# Patient Record
Sex: FEMALE | Race: WHITE | NOT HISPANIC OR LATINO | Employment: OTHER | URBAN - METROPOLITAN AREA
[De-identification: names, ages, dates, MRNs, and addresses within clinical notes are randomized per-mention and may not be internally consistent; named-entity substitution may affect disease eponyms.]

---

## 2017-10-10 ENCOUNTER — CATARACT CONSULT (OUTPATIENT)
Dept: URBAN - METROPOLITAN AREA CLINIC 43 | Facility: CLINIC | Age: 52
End: 2017-10-10

## 2017-10-10 VITALS — HEIGHT: 55 IN | SYSTOLIC BLOOD PRESSURE: 112 MMHG | DIASTOLIC BLOOD PRESSURE: 75 MMHG | HEART RATE: 73 BPM

## 2017-10-10 DIAGNOSIS — Z79.899: ICD-10-CM

## 2017-10-10 DIAGNOSIS — H25.812: ICD-10-CM

## 2017-10-10 DIAGNOSIS — H25.811: ICD-10-CM

## 2017-10-10 DIAGNOSIS — H04.123: ICD-10-CM

## 2017-10-10 PROCEDURE — G8783 BP SCRN PERF REC INTERVAL: HCPCS

## 2017-10-10 PROCEDURE — 4040F PNEUMOC VAC/ADMIN/RCVD: CPT

## 2017-10-10 PROCEDURE — 92025-3 CORNEAL TOPO, REFUSED

## 2017-10-10 PROCEDURE — 92136TC INTERFEROMETRY - TECHNICAL COMPONENT

## 2017-10-10 PROCEDURE — 92134 CPTRZ OPH DX IMG PST SGM RTA: CPT

## 2017-10-10 PROCEDURE — G8427 DOCREV CUR MEDS BY ELIG CLIN: HCPCS

## 2017-10-10 PROCEDURE — 99214 OFFICE O/P EST MOD 30 MIN: CPT

## 2017-10-10 RX ORDER — MOXIFLOXACIN HYDROCHLORIDE 5 MG/ML: 1 SOLUTION/ DROPS OPHTHALMIC

## 2017-10-10 RX ORDER — DUREZOL 0.5 MG/ML: 1 EMULSION OPHTHALMIC TWICE A DAY

## 2017-10-10 RX ORDER — NEPAFENAC 3 MG/ML: 1 SUSPENSION/ DROPS OPHTHALMIC ONCE A DAY

## 2017-10-10 ASSESSMENT — VISUAL ACUITY
OS_CC: J10
OS_SC: 20/200
OS_CC: 20/50
OS_SC: J12
OD_SC: J12
OD_CC: 20/30
OD_SC: 20/200
OS_AM: 20/30
OD_BAT: 20/80 MED
OD_CC: J12
OD_PAM: 20/30-2
OS_BAT: 20/70 MED

## 2017-10-10 ASSESSMENT — TONOMETRY
OD_IOP_MMHG: 13
OS_IOP_MMHG: 12

## 2017-10-11 NOTE — PATIENT DISCUSSION
The patient's findings are compatible with epiretinal membrane with macular pucker. Macular pucker is usually due to previous posterior vitreous detachment but can also be due to uveitis, retinal vascular occlusion, retinal tear, retinal detachment, and idiopathic. Most patients with epiretinal membranes with macular pucker do not progress to the point where intervention is needed. Intervention is typically surgical. The patient can be observed carefully with retinal follow-up in a number of months. If the maculopathy progresses, surgery will be considered.

## 2017-10-31 ENCOUNTER — SURGERY/PROCEDURE (OUTPATIENT)
Dept: URBAN - METROPOLITAN AREA CLINIC 43 | Facility: CLINIC | Age: 52
End: 2017-10-31

## 2017-10-31 DIAGNOSIS — H25.811: ICD-10-CM

## 2017-10-31 PROCEDURE — 66984 XCAPSL CTRC RMVL W/O ECP: CPT

## 2017-11-01 ENCOUNTER — POST-OP (OUTPATIENT)
Dept: URBAN - METROPOLITAN AREA CLINIC 43 | Facility: CLINIC | Age: 52
End: 2017-11-01

## 2017-11-01 DIAGNOSIS — H25.812: ICD-10-CM

## 2017-11-01 DIAGNOSIS — Z96.1: ICD-10-CM

## 2017-11-01 PROCEDURE — G8785 BP SCRN NO PERF AT INTERVAL: HCPCS

## 2017-11-01 PROCEDURE — 99024 POSTOP FOLLOW-UP VISIT: CPT

## 2017-11-01 PROCEDURE — 4004F PT TOBACCO SCREEN RCVD TLK: CPT

## 2017-11-01 PROCEDURE — 4040F PNEUMOC VAC/ADMIN/RCVD: CPT

## 2017-11-01 PROCEDURE — 99213 OFFICE O/P EST LOW 20 MIN: CPT

## 2017-11-01 PROCEDURE — G8427 DOCREV CUR MEDS BY ELIG CLIN: HCPCS

## 2017-11-01 ASSESSMENT — TONOMETRY
OD_IOP_MMHG: 16
OS_IOP_MMHG: 15

## 2017-11-01 ASSESSMENT — VISUAL ACUITY
OS_SC: 20/200
OD_SC: 20/20

## 2017-11-07 ENCOUNTER — SURGERY/PROCEDURE (OUTPATIENT)
Dept: URBAN - METROPOLITAN AREA CLINIC 43 | Facility: CLINIC | Age: 52
End: 2017-11-07

## 2017-11-07 DIAGNOSIS — H25.812: ICD-10-CM

## 2017-11-07 PROCEDURE — 66984 XCAPSL CTRC RMVL W/O ECP: CPT

## 2017-11-08 ENCOUNTER — POST-OP (OUTPATIENT)
Dept: URBAN - METROPOLITAN AREA CLINIC 43 | Facility: CLINIC | Age: 52
End: 2017-11-08

## 2017-11-08 DIAGNOSIS — Z96.1: ICD-10-CM

## 2017-11-08 PROCEDURE — 99024 POSTOP FOLLOW-UP VISIT: CPT

## 2017-11-08 ASSESSMENT — TONOMETRY
OD_IOP_MMHG: 15
OS_IOP_MMHG: 16

## 2017-11-08 ASSESSMENT — VISUAL ACUITY
OD_SC: 20/20
OS_SC: J12
OD_SC: J12
OS_SC: 20/30+2

## 2017-11-28 ENCOUNTER — POST-OP CATARACT (OUTPATIENT)
Dept: URBAN - METROPOLITAN AREA CLINIC 43 | Facility: CLINIC | Age: 52
End: 2017-11-28

## 2017-11-28 DIAGNOSIS — Z96.1: ICD-10-CM

## 2017-11-28 PROCEDURE — 99024 POSTOP FOLLOW-UP VISIT: CPT

## 2017-11-28 ASSESSMENT — TONOMETRY
OS_IOP_MMHG: 15
OD_IOP_MMHG: 14

## 2017-11-28 ASSESSMENT — VISUAL ACUITY
OD_SC: 20/25
OS_SC: 20/25

## 2018-05-25 ENCOUNTER — CONTACT LENS EXAM ESTABLISHED (OUTPATIENT)
Dept: URBAN - METROPOLITAN AREA CLINIC 43 | Facility: CLINIC | Age: 53
End: 2018-05-25

## 2018-05-25 DIAGNOSIS — H52.4: ICD-10-CM

## 2018-05-25 DIAGNOSIS — H52.03: ICD-10-CM

## 2018-05-25 DIAGNOSIS — Z46.0: ICD-10-CM

## 2018-05-25 PROCEDURE — 92014 COMPRE OPH EXAM EST PT 1/>: CPT

## 2018-05-25 PROCEDURE — 92015 DETERMINE REFRACTIVE STATE: CPT

## 2018-05-25 PROCEDURE — 92310-2 LEVEL 2 CONTACT LENS MANAGEMENT

## 2018-05-25 ASSESSMENT — VISUAL ACUITY
OD_SC: J12
OS_SC: 20/30-1
OD_SC: 20/25
OS_SC: J12-

## 2018-05-25 ASSESSMENT — TONOMETRY
OS_IOP_MMHG: 17
OD_IOP_MMHG: 16

## 2018-06-25 ENCOUNTER — CONTACT LENS FOLLOW UP (OUTPATIENT)
Dept: URBAN - METROPOLITAN AREA CLINIC 43 | Facility: CLINIC | Age: 53
End: 2018-06-25

## 2018-06-25 DIAGNOSIS — H52.203: ICD-10-CM

## 2018-06-25 DIAGNOSIS — H52.03: ICD-10-CM

## 2018-06-25 DIAGNOSIS — H52.4: ICD-10-CM

## 2018-06-25 PROCEDURE — 92310F

## 2018-06-25 ASSESSMENT — VISUAL ACUITY
OS_SC: 20/40-1
OS_SC: >J12
OD_CC: J6
OD_CC: 20/200
OS_PH: 20/25-2

## 2018-08-02 ENCOUNTER — CONSULT (OUTPATIENT)
Dept: URBAN - METROPOLITAN AREA CLINIC 43 | Facility: CLINIC | Age: 53
End: 2018-08-02

## 2018-08-02 DIAGNOSIS — H02.33: ICD-10-CM

## 2018-08-02 DIAGNOSIS — H02.834: ICD-10-CM

## 2018-08-02 DIAGNOSIS — H02.832: ICD-10-CM

## 2018-08-02 DIAGNOSIS — H02.36: ICD-10-CM

## 2018-08-02 DIAGNOSIS — H02.831: ICD-10-CM

## 2018-08-02 DIAGNOSIS — Z46.0: ICD-10-CM

## 2018-08-02 DIAGNOSIS — H02.835: ICD-10-CM

## 2018-08-02 PROCEDURE — 99211T TECH SERVICE

## 2018-08-02 PROCEDURE — 92082 INTERMEDIATE VISUAL FIELD XM: CPT

## 2018-08-02 PROCEDURE — 99213 OFFICE O/P EST LOW 20 MIN: CPT

## 2018-08-02 PROCEDURE — 4004F PT TOBACCO SCREEN RCVD TLK: CPT

## 2018-08-02 PROCEDURE — 4040F PNEUMOC VAC/ADMIN/RCVD: CPT

## 2018-08-02 PROCEDURE — 92285 EXTERNAL OCULAR PHOTOGRAPHY: CPT

## 2018-08-02 PROCEDURE — G8785 BP SCRN NO PERF AT INTERVAL: HCPCS

## 2018-08-02 PROCEDURE — G8428 CUR MEDS NOT DOCUMENT: HCPCS

## 2018-08-02 ASSESSMENT — VISUAL ACUITY
OD_CC: 20/50
OS_SC: 20/30

## 2018-11-09 NOTE — PATIENT DISCUSSION
Patient understands condition, prognosis and need for follow up care. Additional Notes: Monthly self skin check

## 2018-11-20 ENCOUNTER — SURGERY/PROCEDURE (OUTPATIENT)
Dept: URBAN - METROPOLITAN AREA CLINIC 43 | Facility: CLINIC | Age: 53
End: 2018-11-20

## 2018-11-20 DIAGNOSIS — H02.831: ICD-10-CM

## 2018-11-20 DIAGNOSIS — H02.834: ICD-10-CM

## 2018-11-20 PROCEDURE — 15823 BLEPHARP UPR EYELID XCSV SKN: CPT

## 2018-11-29 ENCOUNTER — POST-OP (OUTPATIENT)
Dept: URBAN - METROPOLITAN AREA CLINIC 43 | Facility: CLINIC | Age: 53
End: 2018-11-29

## 2018-11-29 DIAGNOSIS — Z98.890: ICD-10-CM

## 2018-11-29 PROCEDURE — 99024 POSTOP FOLLOW-UP VISIT: CPT

## 2018-11-29 ASSESSMENT — VISUAL ACUITY
OD_SC: 20/30
OS_SC: 20/30

## 2019-01-10 NOTE — PATIENT DISCUSSION
[id number] Lilliam Alvarez for [id number] [facility] if pt desires. If +, Goal OS: -2.00.

## 2019-01-11 NOTE — PATIENT DISCUSSION
Gave trial today for CL for mini mono OS goal of -1.00. Will reassess on monday. Call with any FOL or floaters.

## 2019-02-19 NOTE — PATIENT DISCUSSION
Membrane is not visually significant.  Monitor for now, CME was present before surgery, secondary to ERM.   Patient states no distortion.

## 2019-02-19 NOTE — PATIENT DISCUSSION
Tight BS control discussed with patient.  Discussed with the patient the importance of good control of their blood sugar, blood pressure, cholesterol, diet, exercise, weight, and medication usage under the guidance of their diabetic doctor to prevent/halt diabetic retinopathy.

## 2019-02-19 NOTE — PATIENT DISCUSSION
No atrophic holes or tear associated with lattice degeneration OD.  OS Atrophic Area superior.  Will continue to monitor.

## 2019-10-16 NOTE — PROCEDURE NOTE: CLINICAL
PROCEDURE NOTE: Laser for Lattice Degeneration OS. Diagnosis: Lattice Degeneration of Retina. Anesthesia: Topical. Prior to laser, risks/benefits/alternatives to laser discussed including loss of vision, decreased peripheral and night vision, need for more laser and/or surgery and patient wished to proceed. Spot size: 200 um. Power: 380 mW. Number: [id number]. Patient tolerated procedure well. There were no complications. Post procedure instructions given. Valentino Ralph

## 2019-12-19 NOTE — PATIENT DISCUSSION
The types of intraocular lenses were reviewed with the patient along with a discussion of their various strengths and weaknesses. English

## 2020-03-18 ENCOUNTER — ESTABLISHED COMPREHENSIVE EXAM (OUTPATIENT)
Dept: URBAN - METROPOLITAN AREA CLINIC 43 | Facility: CLINIC | Age: 55
End: 2020-03-18

## 2020-03-18 DIAGNOSIS — H04.123: ICD-10-CM

## 2020-03-18 DIAGNOSIS — Z96.1: ICD-10-CM

## 2020-03-18 PROCEDURE — 92014 COMPRE OPH EXAM EST PT 1/>: CPT

## 2020-03-18 PROCEDURE — 92015 DETERMINE REFRACTIVE STATE: CPT

## 2020-03-18 ASSESSMENT — VISUAL ACUITY
OD_CC: J4
OD_SC: 20/25
OS_SC: <J12
OD_SC: <J12
OS_SC: 20/25
OS_CC: J4+

## 2020-03-18 ASSESSMENT — TONOMETRY
OS_IOP_MMHG: 11
OD_IOP_MMHG: 11

## 2020-10-28 NOTE — PATIENT DISCUSSION
F/U with formerly [facility] for plugs, Recommended artificial tears to use: 1 drop 4x a day in both eyes.

## 2020-11-05 NOTE — PROCEDURE NOTE: CLINICAL
PROCEDURE NOTE: Punctal Plugs, Silicone #1 Right Lower Lid. Anesthesia: Topical. Prep: Antibiotic Drops q 5min x 3. Prior to treatment, the risks/benefits/alternatives were discussed. The patient wished to proceed with procedure. One drop of proparacaine was placed and a drop of lidocaine gel was placed over the puncta. 0.4 mm permanent silicone plugs were inserted in * eyelids. Patient tolerated procedure well. There were no complications. Post procedure instructions given. Ledy Perales PROCEDURE NOTE: Punctal Plugs, Silicone #1 Left Lower Lid. Anesthesia: Topical. Prep: Antibiotic Drops q 5min x 3. Prior to treatment, the risks/benefits/alternatives were discussed. The patient wished to proceed with procedure. One drop of proparacaine was placed and a drop of lidocaine gel was placed over the puncta. 0.4 mm permanent silicone plugs were inserted in * eyelids. Patient tolerated procedure well. There were no complications. Post procedure instructions given. Ledy Perales

## 2021-11-19 NOTE — PATIENT DISCUSSION
BLL plugs today. Disc Vital Tears and without enough improvement with plugs alone then consider vital tears.

## 2021-11-19 NOTE — PROCEDURE NOTE: CLINICAL
PROCEDURE NOTE: Punctal Plugs, Silicone #1 Right Lower Lid. Anesthesia: Topical. Prep: Antibiotic Drops q 5min x 3. Prior to treatment, the risks/benefits/alternatives were discussed. The patient wished to proceed with procedure. One drop of proparacaine was placed and a drop of lidocaine gel was placed over the puncta. 0.4 mm permanent silicone plugs were inserted in * eyelids. Patient tolerated procedure well. There were no complications. Post procedure instructions given. Abiola Poe PROCEDURE NOTE: Punctal Plugs, Silicone #1 Left Lower Lid. Anesthesia: Topical. Prep: Antibiotic Drops q 5min x 3. Prior to treatment, the risks/benefits/alternatives were discussed. The patient wished to proceed with procedure. One drop of proparacaine was placed and a drop of lidocaine gel was placed over the puncta. 0.5 mm permanent silicone plugs were inserted in * eyelids. Patient tolerated procedure well. There were no complications. Post procedure instructions given. Abiola Poe

## 2021-12-08 NOTE — PATIENT DISCUSSION
REPLACED TODAY WITH QUINTESS 0.4MM BLL AS SHE IS LOSING THE SILICONE. DISC POSS CAUTERY IN THE FUTURE IF NEC.

## 2022-05-09 NOTE — PROCEDURE NOTE: CLINICAL
PROCEDURE NOTE: Punctal Plugs, Rowan Sin ([id number], [id number]) #1 Left Lower Lid. Prior to treatment, the risks/benefits/alternatives were discussed. The patient wished to proceed with procedure. Temporary collagen plugs were inserted. Patient tolerated procedure well. There were no complications. Post procedure instructions given. Zaheer Guzman PROCEDURE NOTE: Punctal Plugs, Dinorahs Dissolvable ([id number], [id number]) #1 Right Lower Lid. Prior to treatment, the risks/benefits/alternatives were discussed. The patient wished to proceed with procedure. Temporary collagen plugs were inserted. Patient tolerated procedure well. There were no complications. Post procedure instructions given. Zaheer Guzman

## 2022-06-01 ENCOUNTER — RX ONLY (OUTPATIENT)
Age: 57
Setting detail: RX ONLY
End: 2022-06-01

## 2022-08-22 NOTE — PATIENT DISCUSSION
Advised regular use of Amsler grid.
Continue Prolensa QDAY OU until gone.
Contiue Durezol , decrease to BID OU.
Discussed possible association with systemic conditions including hypertension and diabetes.
Glasses Rx given.
Membrane is not visually significant.
Mild Vit Hem is clearing.  No evidence of RT/RD.
Monitor.
No atrophic holes or tear associated with lattice degeneration OD.  OS Atrophic Area superior.  Will continue to monitor.
No retinal detachment or retinal tear noted.
Patient instructed to sleep with head of bed elevated.
Patient is doing well post-operatively. The importance of post-op drop compliance was emphasized. Drop schedule reviewed with patient. Patient to call if any visual changes or concerns.
Patient understands condition, prognosis and need for follow up care.
Patient wishes to defer surgery at this time.
Recommend observation.
Recommended YAG capsulotomy evaluation with Dr. Lauren Diaz once #1,2,3 resolve.
Recommended against surgery at this time given that patient is happy with present vision.
Recommended artificial tears to use: 1 drop 4x a day in both eyes.
Recommended observation.
Retaine MGD qid OU.
Retina 3/25/1 follow up 1 month.
Retinal detachment warnings given.
Retinal tear and detachment warning symptoms reviewed and patient instructed to call immediately if increasing floaters, flashes, or decreasing peripheral vision.
Retinal tear and detachment warning symptoms reviewed.
Scleral depressed 360.
Stable.
The patient feels that the cataract is significantly impacting daily activities and has elected cataract surgery. The risks, benefits, and alternatives to surgery were discussed. The patient elects to proceed with surgery.
The types of intraocular lenses were reviewed with the patient along with a discussion of their various strengths and weaknesses.
Tight BS control discussed with patient.  Discussed with the patient the importance of good control of their blood sugar, blood pressure, cholesterol, diet, exercise, weight, and medication usage under the guidance of their diabetic doctor to prevent/halt diabetic retinopathy.
ordered CL CVUE.
No

## 2022-11-28 ENCOUNTER — APPOINTMENT (RX ONLY)
Dept: URBAN - METROPOLITAN AREA CLINIC 137 | Facility: CLINIC | Age: 57
Setting detail: DERMATOLOGY
End: 2022-11-28

## 2022-11-28 DIAGNOSIS — D49.2 NEOPLASM OF UNSPECIFIED BEHAVIOR OF BONE, SOFT TISSUE, AND SKIN: ICD-10-CM

## 2022-11-28 DIAGNOSIS — Z85.828 PERSONAL HISTORY OF OTHER MALIGNANT NEOPLASM OF SKIN: ICD-10-CM | Status: STABLE

## 2022-11-28 DIAGNOSIS — D18.0 HEMANGIOMA: ICD-10-CM | Status: UNCHANGED

## 2022-11-28 DIAGNOSIS — L57.8 OTHER SKIN CHANGES DUE TO CHRONIC EXPOSURE TO NONIONIZING RADIATION: ICD-10-CM | Status: UNCHANGED

## 2022-11-28 DIAGNOSIS — Z71.89 OTHER SPECIFIED COUNSELING: ICD-10-CM

## 2022-11-28 PROBLEM — D18.01 HEMANGIOMA OF SKIN AND SUBCUTANEOUS TISSUE: Status: ACTIVE | Noted: 2022-11-28

## 2022-11-28 PROCEDURE — 99213 OFFICE O/P EST LOW 20 MIN: CPT | Mod: 25

## 2022-11-28 PROCEDURE — ? SUNSCREEN RECOMMENDATIONS

## 2022-11-28 PROCEDURE — 11102 TANGNTL BX SKIN SINGLE LES: CPT

## 2022-11-28 PROCEDURE — ? BIOPSY BY SHAVE METHOD

## 2022-11-28 PROCEDURE — ? COUNSELING

## 2022-11-28 ASSESSMENT — LOCATION DETAILED DESCRIPTION DERM
LOCATION DETAILED: INFERIOR THORACIC SPINE
LOCATION DETAILED: LEFT CLAVICULAR NECK
LOCATION DETAILED: PERIUMBILICAL SKIN
LOCATION DETAILED: RIGHT MEDIAL TRAPEZIAL NECK
LOCATION DETAILED: RIGHT INFERIOR CENTRAL MALAR CHEEK

## 2022-11-28 ASSESSMENT — LOCATION ZONE DERM
LOCATION ZONE: NECK
LOCATION ZONE: TRUNK
LOCATION ZONE: FACE

## 2022-11-28 ASSESSMENT — LOCATION SIMPLE DESCRIPTION DERM
LOCATION SIMPLE: ABDOMEN
LOCATION SIMPLE: RIGHT CHEEK
LOCATION SIMPLE: POSTERIOR NECK
LOCATION SIMPLE: UPPER BACK
LOCATION SIMPLE: LEFT ANTERIOR NECK

## 2022-11-28 NOTE — PROCEDURE: BIOPSY BY SHAVE METHOD
Detail Level: Detailed
Depth Of Biopsy: dermis
Was A Bandage Applied: Yes
Size Of Lesion In Cm: 0
Biopsy Type: H and E
Biopsy Method: Dermablade
Anesthesia Type: 1% lidocaine with epinephrine
Anesthesia Volume In Cc (Will Not Render If 0): 0.5
Hemostasis: Monsel's and Electrocautery
Wound Care: Petrolatum
Dressing: Band-Aid
Destruction After The Procedure: No
Type Of Destruction Used: Curettage
Curettage Text: The wound bed was treated with curettage after the biopsy was performed.
Cryotherapy Text: The wound bed was treated with cryotherapy after the biopsy was performed.
Electrodesiccation Text: The wound bed was treated with electrodesiccation after the biopsy was performed.
Electrodesiccation And Curettage Text: The wound bed was treated with electrodesiccation and curettage after the biopsy was performed.
Silver Nitrate Text: The wound bed was treated with silver nitrate after the biopsy was performed.
Lab: [id number]
Consent: Written consent was obtained and risks were reviewed including but not limited to scarring, infection, bleeding, scabbing, incomplete removal, nerve damage and allergy to anesthesia.
Post-Care Instructions: I reviewed with the patient in detail post-care instructions. Patient is to keep the biopsy site dry overnight, and then apply bacitracin twice daily until healed. Patient may apply hydrogen peroxide soaks to remove any crusting.
Notification Instructions: Patient will be notified of biopsy results. However, patient instructed to call the office if not contacted within 2 weeks.
Billing Type: [employer]
Information: Selecting Yes will display possible errors in your note based on the variables you have selected. This validation is only offered as a suggestion for you. PLEASE NOTE THAT THE VALIDATION TEXT WILL BE REMOVED WHEN YOU FINALIZE YOUR NOTE. IF YOU WANT TO FAX A PRELIMINARY NOTE YOU WILL NEED TO TOGGLE THIS TO 'NO' IF YOU DO NOT WANT IT IN YOUR FAXED NOTE.

## 2022-12-23 NOTE — PROCEDURE NOTE: CLINICAL
PROCEDURE NOTE: Punctal Plugs, Dissolvable #1 Left Lower Lid. Anesthesia: Proparacaine 0.5%. Prior to treatment, the risks/benefits/alternatives were discussed. The patient wished to proceed with procedure. 1 drop of Proparacaine 0.5% was instilled. Puncta was dilated with punctal dilator. Dissolvable punctal plugs were inserted. Size/location of plugs inserted: 0.4mm. The patient tolerated the procedure well. There were no complications. Post procedure instructions given. Etta Ramey

## 2023-01-06 NOTE — PROCEDURE NOTE: CLINICAL
PROCEDURE NOTE: Punctal Plugs, Dissolvable #1 Right Lower Lid. Anesthesia: Proparacaine 0.5%. Prior to treatment, the risks/benefits/alternatives were discussed. The patient wished to proceed with procedure. 1 drop of Proparacaine 0.5% was instilled. Puncta was dilated with punctal dilator. Dissolvable punctal plugs were inserted. Size/location of plugs inserted: 0.3mm. The patient tolerated the procedure well. There were no complications. Post procedure instructions given. Steve Wahl

## 2023-06-06 NOTE — PATIENT DISCUSSION
Patient understands condition, prognosis and need for follow up care. [FreeTextEntry1] : Continue Keppra 500 mg bid(20 mg/kg/day). Will get routine and 24 hr EEGs.  RTO 6 months. Pt advised to keep well hydrated, get 9 hrs sleep, limit computer use. Note sent to Dr Chicas(PCP).\par Total clinician time spent on 6/6/2023 is 33 minutes including preparing to see the patient, obtaining and/or reviewing and confirming history, performing a medically necessary and appropriate examination, counseling and educating the patient and/or family, documenting clinical information in the EHR and communicating and/or referring to other healthcare professionals. \par

## 2023-09-08 ENCOUNTER — COMPREHENSIVE EXAM (OUTPATIENT)
Dept: URBAN - METROPOLITAN AREA CLINIC 43 | Facility: CLINIC | Age: 58
End: 2023-09-08

## 2023-09-08 DIAGNOSIS — H04.123: ICD-10-CM

## 2023-09-08 DIAGNOSIS — Z79.899: ICD-10-CM

## 2023-09-08 DIAGNOSIS — H43.812: ICD-10-CM

## 2023-09-08 DIAGNOSIS — H52.4: ICD-10-CM

## 2023-09-08 DIAGNOSIS — H43.393: ICD-10-CM

## 2023-09-08 PROCEDURE — 92015 DETERMINE REFRACTIVE STATE: CPT

## 2023-09-08 PROCEDURE — 92134 CPTRZ OPH DX IMG PST SGM RTA: CPT

## 2023-09-08 PROCEDURE — 92014 COMPRE OPH EXAM EST PT 1/>: CPT

## 2023-09-08 ASSESSMENT — VISUAL ACUITY
OS_SC: 20/25-1
OS_SC: J12
OU_SC: 20/20
OD_SC: 20/20
OD_SC: J12
OU_CC: J1
OD_CC: J1
OS_CC: J2
OU_SC: J12

## 2023-09-08 ASSESSMENT — TONOMETRY
OS_IOP_MMHG: 10
OD_IOP_MMHG: 10

## 2023-10-11 ENCOUNTER — DIAGNOSTICS ONLY (OUTPATIENT)
Dept: URBAN - METROPOLITAN AREA CLINIC 43 | Facility: CLINIC | Age: 58
End: 2023-10-11

## 2023-10-11 DIAGNOSIS — Z79.899: ICD-10-CM

## 2023-10-11 PROCEDURE — 92083 EXTENDED VISUAL FIELD XM: CPT

## 2024-11-01 ENCOUNTER — COMPREHENSIVE EXAM (OUTPATIENT)
Dept: URBAN - METROPOLITAN AREA CLINIC 43 | Facility: CLINIC | Age: 59
End: 2024-11-01

## 2024-11-01 DIAGNOSIS — Z79.899: ICD-10-CM

## 2024-11-01 DIAGNOSIS — H04.123: ICD-10-CM

## 2024-11-01 DIAGNOSIS — H52.4: ICD-10-CM

## 2024-11-01 PROCEDURE — 92015 DETERMINE REFRACTIVE STATE: CPT

## 2024-11-01 PROCEDURE — 92014 COMPRE OPH EXAM EST PT 1/>: CPT | Mod: 25

## 2024-11-01 PROCEDURE — 92083 EXTENDED VISUAL FIELD XM: CPT
